# Patient Record
Sex: FEMALE | Race: WHITE | NOT HISPANIC OR LATINO | Employment: OTHER | URBAN - METROPOLITAN AREA
[De-identification: names, ages, dates, MRNs, and addresses within clinical notes are randomized per-mention and may not be internally consistent; named-entity substitution may affect disease eponyms.]

---

## 2022-03-15 DIAGNOSIS — R13.10 DYSPHAGIA: Primary | ICD-10-CM

## 2022-03-20 ENCOUNTER — HOSPITAL ENCOUNTER (EMERGENCY)
Facility: HOSPITAL | Age: 77
Discharge: ANOTHER HOSPITAL | End: 2022-03-21
Attending: STUDENT IN AN ORGANIZED HEALTH CARE EDUCATION/TRAINING PROGRAM
Payer: MEDICARE

## 2022-03-20 DIAGNOSIS — R07.9 CHEST PAIN: ICD-10-CM

## 2022-03-20 DIAGNOSIS — K92.0 COFFEE GROUND EMESIS: Primary | ICD-10-CM

## 2022-03-20 DIAGNOSIS — K80.20 CALCULUS OF GALLBLADDER WITHOUT CHOLECYSTITIS WITHOUT OBSTRUCTION: ICD-10-CM

## 2022-03-20 DIAGNOSIS — K62.89 PROCTITIS: ICD-10-CM

## 2022-03-20 DIAGNOSIS — N39.0 URINARY TRACT INFECTION WITHOUT HEMATURIA, SITE UNSPECIFIED: ICD-10-CM

## 2022-03-20 DIAGNOSIS — N17.9 AKI (ACUTE KIDNEY INJURY): ICD-10-CM

## 2022-03-20 DIAGNOSIS — J18.9 PNEUMONIA OF BOTH UPPER LOBES DUE TO INFECTIOUS ORGANISM: ICD-10-CM

## 2022-03-20 PROCEDURE — 99291 CRITICAL CARE FIRST HOUR: CPT | Mod: 25

## 2022-03-21 VITALS
WEIGHT: 178 LBS | TEMPERATURE: 97 F | DIASTOLIC BLOOD PRESSURE: 71 MMHG | OXYGEN SATURATION: 98 % | BODY MASS INDEX: 27.94 KG/M2 | RESPIRATION RATE: 17 BRPM | HEIGHT: 67 IN | HEART RATE: 102 BPM | SYSTOLIC BLOOD PRESSURE: 135 MMHG

## 2022-03-21 LAB
ABO + RH BLD: NORMAL
ALBUMIN SERPL BCP-MCNC: 2.8 G/DL (ref 3.5–5.2)
ALBUMIN SERPL BCP-MCNC: 2.9 G/DL (ref 3.5–5.2)
ALP SERPL-CCNC: 81 U/L (ref 55–135)
ALP SERPL-CCNC: 82 U/L (ref 55–135)
ALT SERPL W/O P-5'-P-CCNC: 18 U/L (ref 10–44)
ALT SERPL W/O P-5'-P-CCNC: 21 U/L (ref 10–44)
ANION GAP SERPL CALC-SCNC: 12 MMOL/L (ref 8–16)
ANION GAP SERPL CALC-SCNC: 14 MMOL/L (ref 8–16)
APTT BLDCRRT: 27.7 SEC (ref 21–32)
AST SERPL-CCNC: 16 U/L (ref 10–40)
AST SERPL-CCNC: 19 U/L (ref 10–40)
BACTERIA #/AREA URNS HPF: ABNORMAL /HPF
BASOPHILS # BLD AUTO: 0.03 K/UL (ref 0–0.2)
BASOPHILS # BLD AUTO: 0.04 K/UL (ref 0–0.2)
BASOPHILS NFR BLD: 0.2 % (ref 0–1.9)
BASOPHILS NFR BLD: 0.3 % (ref 0–1.9)
BILIRUB SERPL-MCNC: 0.5 MG/DL (ref 0.1–1)
BILIRUB SERPL-MCNC: 0.6 MG/DL (ref 0.1–1)
BILIRUB UR QL STRIP: NEGATIVE
BLD GP AB SCN CELLS X3 SERPL QL: NORMAL
BNP SERPL-MCNC: 94 PG/ML (ref 0–99)
BUN SERPL-MCNC: 37 MG/DL (ref 8–23)
BUN SERPL-MCNC: 39 MG/DL (ref 8–23)
CALCIUM SERPL-MCNC: 9.4 MG/DL (ref 8.7–10.5)
CALCIUM SERPL-MCNC: 9.5 MG/DL (ref 8.7–10.5)
CHLORIDE SERPL-SCNC: 100 MMOL/L (ref 95–110)
CHLORIDE SERPL-SCNC: 98 MMOL/L (ref 95–110)
CLARITY UR: ABNORMAL
CO2 SERPL-SCNC: 24 MMOL/L (ref 23–29)
CO2 SERPL-SCNC: 26 MMOL/L (ref 23–29)
COLOR UR: YELLOW
CREAT SERPL-MCNC: 1.5 MG/DL (ref 0.5–1.4)
CREAT SERPL-MCNC: 1.5 MG/DL (ref 0.5–1.4)
DIFFERENTIAL METHOD: ABNORMAL
DIFFERENTIAL METHOD: ABNORMAL
EOSINOPHIL # BLD AUTO: 0.1 K/UL (ref 0–0.5)
EOSINOPHIL # BLD AUTO: 0.3 K/UL (ref 0–0.5)
EOSINOPHIL NFR BLD: 0.6 % (ref 0–8)
EOSINOPHIL NFR BLD: 2.2 % (ref 0–8)
ERYTHROCYTE [DISTWIDTH] IN BLOOD BY AUTOMATED COUNT: 17.2 % (ref 11.5–14.5)
ERYTHROCYTE [DISTWIDTH] IN BLOOD BY AUTOMATED COUNT: 17.3 % (ref 11.5–14.5)
EST. GFR  (AFRICAN AMERICAN): 38 ML/MIN/1.73 M^2
EST. GFR  (AFRICAN AMERICAN): 38 ML/MIN/1.73 M^2
EST. GFR  (NON AFRICAN AMERICAN): 33 ML/MIN/1.73 M^2
EST. GFR  (NON AFRICAN AMERICAN): 33 ML/MIN/1.73 M^2
GLUCOSE SERPL-MCNC: 115 MG/DL (ref 70–110)
GLUCOSE SERPL-MCNC: 121 MG/DL (ref 70–110)
GLUCOSE UR QL STRIP: NEGATIVE
HCT VFR BLD AUTO: 32.5 % (ref 37–48.5)
HCT VFR BLD AUTO: 34.3 % (ref 37–48.5)
HGB BLD-MCNC: 10.4 G/DL (ref 12–16)
HGB BLD-MCNC: 10.8 G/DL (ref 12–16)
HGB UR QL STRIP: NEGATIVE
HYALINE CASTS #/AREA URNS LPF: 0 /LPF
IMM GRANULOCYTES # BLD AUTO: 0.08 K/UL (ref 0–0.04)
IMM GRANULOCYTES # BLD AUTO: 0.11 K/UL (ref 0–0.04)
IMM GRANULOCYTES NFR BLD AUTO: 0.5 % (ref 0–0.5)
IMM GRANULOCYTES NFR BLD AUTO: 0.6 % (ref 0–0.5)
INR PPP: 1 (ref 0.8–1.2)
KETONES UR QL STRIP: NEGATIVE
LACTATE SERPL-SCNC: 1 MMOL/L (ref 0.5–2.2)
LEUKOCYTE ESTERASE UR QL STRIP: ABNORMAL
LIPASE SERPL-CCNC: 64 U/L (ref 4–60)
LYMPHOCYTES # BLD AUTO: 1.4 K/UL (ref 1–4.8)
LYMPHOCYTES # BLD AUTO: 1.6 K/UL (ref 1–4.8)
LYMPHOCYTES NFR BLD: 10.2 % (ref 18–48)
LYMPHOCYTES NFR BLD: 8.2 % (ref 18–48)
MCH RBC QN AUTO: 29.2 PG (ref 27–31)
MCH RBC QN AUTO: 29.4 PG (ref 27–31)
MCHC RBC AUTO-ENTMCNC: 31.5 G/DL (ref 32–36)
MCHC RBC AUTO-ENTMCNC: 32 G/DL (ref 32–36)
MCV RBC AUTO: 92 FL (ref 82–98)
MCV RBC AUTO: 93 FL (ref 82–98)
MICROSCOPIC COMMENT: ABNORMAL
MONOCYTES # BLD AUTO: 1.2 K/UL (ref 0.3–1)
MONOCYTES # BLD AUTO: 1.2 K/UL (ref 0.3–1)
MONOCYTES NFR BLD: 7.2 % (ref 4–15)
MONOCYTES NFR BLD: 7.8 % (ref 4–15)
NEUTROPHILS # BLD AUTO: 12 K/UL (ref 1.8–7.7)
NEUTROPHILS # BLD AUTO: 14.1 K/UL (ref 1.8–7.7)
NEUTROPHILS NFR BLD: 79 % (ref 38–73)
NEUTROPHILS NFR BLD: 83.2 % (ref 38–73)
NITRITE UR QL STRIP: NEGATIVE
NRBC BLD-RTO: 0 /100 WBC
NRBC BLD-RTO: 0 /100 WBC
OB PNL STL: NEGATIVE
OTHER ELEMENTS URNS MICRO: ABNORMAL
PH UR STRIP: >8 [PH] (ref 5–8)
PLATELET # BLD AUTO: 541 K/UL (ref 150–450)
PLATELET # BLD AUTO: 548 K/UL (ref 150–450)
PMV BLD AUTO: 10.5 FL (ref 9.2–12.9)
PMV BLD AUTO: 10.7 FL (ref 9.2–12.9)
POTASSIUM SERPL-SCNC: 4.7 MMOL/L (ref 3.5–5.1)
POTASSIUM SERPL-SCNC: 4.8 MMOL/L (ref 3.5–5.1)
PROT SERPL-MCNC: 6.5 G/DL (ref 6–8.4)
PROT SERPL-MCNC: 7.1 G/DL (ref 6–8.4)
PROT UR QL STRIP: ABNORMAL
PROTHROMBIN TIME: 10.3 SEC (ref 9–12.5)
RBC # BLD AUTO: 3.54 M/UL (ref 4–5.4)
RBC # BLD AUTO: 3.7 M/UL (ref 4–5.4)
RBC #/AREA URNS HPF: 6 /HPF (ref 0–4)
SARS-COV-2 RDRP RESP QL NAA+PROBE: NEGATIVE
SODIUM SERPL-SCNC: 136 MMOL/L (ref 136–145)
SODIUM SERPL-SCNC: 138 MMOL/L (ref 136–145)
SP GR UR STRIP: 1.01 (ref 1–1.03)
TROPONIN I SERPL DL<=0.01 NG/ML-MCNC: <0.006 NG/ML (ref 0–0.03)
URN SPEC COLLECT METH UR: ABNORMAL
UROBILINOGEN UR STRIP-ACNC: NEGATIVE EU/DL
WBC # BLD AUTO: 15.17 K/UL (ref 3.9–12.7)
WBC # BLD AUTO: 16.99 K/UL (ref 3.9–12.7)
WBC #/AREA URNS HPF: >100 /HPF (ref 0–5)

## 2022-03-21 PROCEDURE — 83605 ASSAY OF LACTIC ACID: CPT | Performed by: STUDENT IN AN ORGANIZED HEALTH CARE EDUCATION/TRAINING PROGRAM

## 2022-03-21 PROCEDURE — 84484 ASSAY OF TROPONIN QUANT: CPT | Performed by: STUDENT IN AN ORGANIZED HEALTH CARE EDUCATION/TRAINING PROGRAM

## 2022-03-21 PROCEDURE — 82272 OCCULT BLD FECES 1-3 TESTS: CPT | Performed by: STUDENT IN AN ORGANIZED HEALTH CARE EDUCATION/TRAINING PROGRAM

## 2022-03-21 PROCEDURE — 85610 PROTHROMBIN TIME: CPT | Performed by: STUDENT IN AN ORGANIZED HEALTH CARE EDUCATION/TRAINING PROGRAM

## 2022-03-21 PROCEDURE — 25500020 PHARM REV CODE 255: Performed by: STUDENT IN AN ORGANIZED HEALTH CARE EDUCATION/TRAINING PROGRAM

## 2022-03-21 PROCEDURE — 86850 RBC ANTIBODY SCREEN: CPT | Performed by: STUDENT IN AN ORGANIZED HEALTH CARE EDUCATION/TRAINING PROGRAM

## 2022-03-21 PROCEDURE — 87186 SC STD MICRODIL/AGAR DIL: CPT | Performed by: STUDENT IN AN ORGANIZED HEALTH CARE EDUCATION/TRAINING PROGRAM

## 2022-03-21 PROCEDURE — 87086 URINE CULTURE/COLONY COUNT: CPT | Performed by: STUDENT IN AN ORGANIZED HEALTH CARE EDUCATION/TRAINING PROGRAM

## 2022-03-21 PROCEDURE — 83880 ASSAY OF NATRIURETIC PEPTIDE: CPT | Performed by: STUDENT IN AN ORGANIZED HEALTH CARE EDUCATION/TRAINING PROGRAM

## 2022-03-21 PROCEDURE — 85730 THROMBOPLASTIN TIME PARTIAL: CPT | Performed by: STUDENT IN AN ORGANIZED HEALTH CARE EDUCATION/TRAINING PROGRAM

## 2022-03-21 PROCEDURE — 36415 COLL VENOUS BLD VENIPUNCTURE: CPT | Performed by: STUDENT IN AN ORGANIZED HEALTH CARE EDUCATION/TRAINING PROGRAM

## 2022-03-21 PROCEDURE — 87040 BLOOD CULTURE FOR BACTERIA: CPT | Mod: 59 | Performed by: STUDENT IN AN ORGANIZED HEALTH CARE EDUCATION/TRAINING PROGRAM

## 2022-03-21 PROCEDURE — 93010 EKG 12-LEAD: ICD-10-PCS | Mod: ,,, | Performed by: INTERNAL MEDICINE

## 2022-03-21 PROCEDURE — 87077 CULTURE AEROBIC IDENTIFY: CPT | Performed by: STUDENT IN AN ORGANIZED HEALTH CARE EDUCATION/TRAINING PROGRAM

## 2022-03-21 PROCEDURE — 25000003 PHARM REV CODE 250: Performed by: STUDENT IN AN ORGANIZED HEALTH CARE EDUCATION/TRAINING PROGRAM

## 2022-03-21 PROCEDURE — 80053 COMPREHEN METABOLIC PANEL: CPT | Mod: 91 | Performed by: STUDENT IN AN ORGANIZED HEALTH CARE EDUCATION/TRAINING PROGRAM

## 2022-03-21 PROCEDURE — 85025 COMPLETE CBC W/AUTO DIFF WBC: CPT | Performed by: STUDENT IN AN ORGANIZED HEALTH CARE EDUCATION/TRAINING PROGRAM

## 2022-03-21 PROCEDURE — 81000 URINALYSIS NONAUTO W/SCOPE: CPT | Performed by: STUDENT IN AN ORGANIZED HEALTH CARE EDUCATION/TRAINING PROGRAM

## 2022-03-21 PROCEDURE — U0002 COVID-19 LAB TEST NON-CDC: HCPCS | Performed by: STUDENT IN AN ORGANIZED HEALTH CARE EDUCATION/TRAINING PROGRAM

## 2022-03-21 PROCEDURE — 63600175 PHARM REV CODE 636 W HCPCS: Performed by: STUDENT IN AN ORGANIZED HEALTH CARE EDUCATION/TRAINING PROGRAM

## 2022-03-21 PROCEDURE — 85025 COMPLETE CBC W/AUTO DIFF WBC: CPT | Mod: 91 | Performed by: STUDENT IN AN ORGANIZED HEALTH CARE EDUCATION/TRAINING PROGRAM

## 2022-03-21 PROCEDURE — C9113 INJ PANTOPRAZOLE SODIUM, VIA: HCPCS | Performed by: STUDENT IN AN ORGANIZED HEALTH CARE EDUCATION/TRAINING PROGRAM

## 2022-03-21 PROCEDURE — 96365 THER/PROPH/DIAG IV INF INIT: CPT

## 2022-03-21 PROCEDURE — 80053 COMPREHEN METABOLIC PANEL: CPT | Performed by: STUDENT IN AN ORGANIZED HEALTH CARE EDUCATION/TRAINING PROGRAM

## 2022-03-21 PROCEDURE — 93005 ELECTROCARDIOGRAM TRACING: CPT

## 2022-03-21 PROCEDURE — S0030 INJECTION, METRONIDAZOLE: HCPCS | Performed by: STUDENT IN AN ORGANIZED HEALTH CARE EDUCATION/TRAINING PROGRAM

## 2022-03-21 PROCEDURE — 93010 ELECTROCARDIOGRAM REPORT: CPT | Mod: ,,, | Performed by: INTERNAL MEDICINE

## 2022-03-21 PROCEDURE — 87088 URINE BACTERIA CULTURE: CPT | Performed by: STUDENT IN AN ORGANIZED HEALTH CARE EDUCATION/TRAINING PROGRAM

## 2022-03-21 PROCEDURE — 96375 TX/PRO/DX INJ NEW DRUG ADDON: CPT

## 2022-03-21 PROCEDURE — 96367 TX/PROPH/DG ADDL SEQ IV INF: CPT

## 2022-03-21 PROCEDURE — 83690 ASSAY OF LIPASE: CPT | Performed by: STUDENT IN AN ORGANIZED HEALTH CARE EDUCATION/TRAINING PROGRAM

## 2022-03-21 PROCEDURE — 96374 THER/PROPH/DIAG INJ IV PUSH: CPT | Mod: 59

## 2022-03-21 PROCEDURE — 96361 HYDRATE IV INFUSION ADD-ON: CPT

## 2022-03-21 RX ORDER — NAPROXEN SODIUM 220 MG/1
324 TABLET, FILM COATED ORAL
Status: DISCONTINUED | OUTPATIENT
Start: 2022-03-21 | End: 2022-03-21

## 2022-03-21 RX ORDER — ONDANSETRON 2 MG/ML
8 INJECTION INTRAMUSCULAR; INTRAVENOUS
Status: COMPLETED | OUTPATIENT
Start: 2022-03-21 | End: 2022-03-21

## 2022-03-21 RX ORDER — METRONIDAZOLE 500 MG/100ML
500 INJECTION, SOLUTION INTRAVENOUS
Status: COMPLETED | OUTPATIENT
Start: 2022-03-21 | End: 2022-03-21

## 2022-03-21 RX ORDER — CEFEPIME HYDROCHLORIDE 1 G/50ML
2 INJECTION, SOLUTION INTRAVENOUS
Status: COMPLETED | OUTPATIENT
Start: 2022-03-21 | End: 2022-03-21

## 2022-03-21 RX ORDER — VANCOMYCIN HCL IN 5 % DEXTROSE 1G/250ML
1000 PLASTIC BAG, INJECTION (ML) INTRAVENOUS
Status: DISCONTINUED | OUTPATIENT
Start: 2022-03-22 | End: 2022-03-21 | Stop reason: HOSPADM

## 2022-03-21 RX ORDER — PANTOPRAZOLE SODIUM 40 MG/10ML
80 INJECTION, POWDER, LYOPHILIZED, FOR SOLUTION INTRAVENOUS
Status: COMPLETED | OUTPATIENT
Start: 2022-03-21 | End: 2022-03-21

## 2022-03-21 RX ADMIN — CEFEPIME HYDROCHLORIDE 2 G: 2 INJECTION, SOLUTION INTRAVENOUS at 05:03

## 2022-03-21 RX ADMIN — IOHEXOL 100 ML: 350 INJECTION, SOLUTION INTRAVENOUS at 02:03

## 2022-03-21 RX ADMIN — SODIUM CHLORIDE, SODIUM LACTATE, POTASSIUM CHLORIDE, AND CALCIUM CHLORIDE 500 ML: .6; .31; .03; .02 INJECTION, SOLUTION INTRAVENOUS at 04:03

## 2022-03-21 RX ADMIN — ONDANSETRON HYDROCHLORIDE 8 MG: 2 SOLUTION INTRAMUSCULAR; INTRAVENOUS at 07:03

## 2022-03-21 RX ADMIN — PANTOPRAZOLE SODIUM 8 MG/HR: 40 INJECTION, POWDER, FOR SOLUTION INTRAVENOUS at 07:03

## 2022-03-21 RX ADMIN — VANCOMYCIN HYDROCHLORIDE 1500 MG: 1.5 INJECTION, POWDER, LYOPHILIZED, FOR SOLUTION INTRAVENOUS at 08:03

## 2022-03-21 RX ADMIN — PANTOPRAZOLE SODIUM 80 MG: 40 INJECTION, POWDER, FOR SOLUTION INTRAVENOUS at 07:03

## 2022-03-21 RX ADMIN — METRONIDAZOLE 500 MG: 500 INJECTION, SOLUTION INTRAVENOUS at 06:03

## 2022-03-21 NOTE — ED NOTES
Pt resting comfortably on ED stretcher. NADN. AAOx3. Respirations even and unlabored. Bed locked in lowest position. Call bell within reach. Son at bedside. Protonix infusing without difficulty. Awaiting MD orders.

## 2022-03-21 NOTE — ED NOTES
Report taken from ELO Ontiveros. Pt resting comfortably on ED stretcher. NADN. AAOx3. Respirations even and unlabored. Bed locked in lowest position. Call bell within reach. Son at bedside. Antibiotics infusing without difficulty. Awaiting MD orders.

## 2022-03-21 NOTE — ED NOTES
Pt resting comfortably on ED stretcher. NADN. AAOx3. Respirations even and unlabored. Bed locked in lowest position. Call bell within reach. Protonix and antibiotics infusing without difficulty. Son at bedside. Awaiting transfer.

## 2022-03-21 NOTE — PROVIDER PROGRESS NOTES - EMERGENCY DEPT.
Encounter Date: 3/20/2022    ED Physician Progress Notes             I am assuming care of patient Caron Enrique from physician Dr. Reece at 6:00 AM.    76 year old who presented with emesis, abdominal and chest pain.    I spoke with the patient and the son. Had a large amount of dark brown emesis at the nursing home. Was on blood thinners when admitted for pneumonia but denies any regular NSAID use or outpatient anticoagulation/antiplatelets.    Leukocytosis, ALEXANDER, UTI, Hgb 10.4. BUN 39, Cr 1.5.    Dr. Rausch was alerted by Dr. Reece regarding CT results; recommends US, states if cholecystitis, can stay here.    Condition: stable  Pending: RUQ US  Contingency plan: if cholecystitis, surgery consult  Anticipated dispo: pending US but likely admit for ALEXANDER, UTI +/- cholecystitis    I am adding repeat CBC, CMP, coag studies, type and screen, and protonix bolus + drip for possible upper GI bleed.                Given history of coffee ground emesis - I am concerned regarding an upper GI bleed. No cholecystitis on US, normal LFTs. I recommend transfer for GI evaluation. Patient is requesting [facility] where she was recently admitted and discharged for pneumonia. She was admitted for 30 days, intubated for 11 days, had a PEG tube placed by Dr. Carvajal and was seeing Dr. Marshall at [facility].

## 2022-03-21 NOTE — PROGRESS NOTES
Pharmacokinetic Initial Assessment: IV Vancomycin    Assessment/Plan:    Initiate intravenous vancomycin with loading dose of 1500 mg once (20mg/kg due to renal function) followed by a maintenance dose of vancomycin 1000mg IV every 24 hours  Desired empiric serum trough concentration is 10 to 15 mcg/mL  Draw vancomycin trough level 60 min prior to third dose on 3/23 at approximately 0630  Pharmacy will continue to follow and monitor vancomycin.      Please contact pharmacy at extension [phone] with any questions regarding this assessment.     Thank you for the consult,   Arabella Marin       Patient brief summary:  Caron Enrique is a 76 y.o. female initiated on antimicrobial therapy with IV Vancomycin for treatment of suspected Intra-abdominal Comment - Cholecystitis    Drug Allergies:   Review of patient's allergies indicates:   Allergen Reactions    Erythromycin base        Actual Body Weight:   80kg    Renal Function:   Estimated Creatinine Clearance: 33.8 mL/min (A) (based on SCr of 1.5 mg/dL (H)).,     Dialysis Method (if applicable):  N/A    CBC (last 72 hours):  Recent Labs   Lab Result Units 03/21/22  0055   WBC K/uL 15.17*   Hemoglobin g/dL 10.4*   Hematocrit % 32.5*   Platelets K/uL 541*   Gran % % 79.0*   Lymph % % 10.2*   Mono % % 7.8   Eosinophil % % 2.2   Basophil % % 0.3   Differential Method  Automated       Metabolic Panel (last 72 hours):  Recent Labs   Lab Result Units 03/21/22  0055 03/21/22  0300   Sodium mmol/L 138  --    Potassium mmol/L 4.8  --    Chloride mmol/L 100  --    CO2 mmol/L 24  --    Glucose mg/dL 115*  --    Glucose, UA   --  Negative   BUN mg/dL 39*  --    Creatinine mg/dL 1.5*  --    Albumin g/dL 2.9*  --    Total Bilirubin mg/dL 0.5  --    Alkaline Phosphatase U/L 81  --    AST U/L 19  --    ALT U/L 21  --        Drug levels (last 3 results):  No results for input(s): VANCOMYCINRA, VANCOMYCINPE, VANCOMYCINTR in the last 72 hours.    Microbiologic Results:  Microbiology  Results (last 7 days)       Procedure Component Value Units Date/Time    Urine culture [[id number]] Collected: 03/21/22 0300    Order Status: No result Specimen: Urine Updated: 03/21/22 0317

## 2022-03-21 NOTE — ED NOTES
Pt unable to urinate at this time. Pt diapered - refused catheter. States she will let us know when she needs to urinate and will try a bed pan at that time.

## 2022-03-21 NOTE — ED PROVIDER NOTES
[facility]                                                  Chief Complaint     Chief Complaint   Patient presents with    Abdominal Pain     Generalized abd pain and midsternal chest pain onset yesterday with n/v onset today - PEG tube.     Chest Pain       History of Present Illness  76 y.o. female with recently diagnosed with pneumonia at the end of last month and was subsequently intubated. PEG tube placed during that admission. She was eventually extubated and discharged 3 weeks ago.  Following discharge, she started rehab and has been doing well with this. However, 1 week ago, she began having vomiting and chest pain. This morning, she had copious dark brown vomiting, prompting concern for GI bleed by her nursing home staff. Endorses abdominal and chest pain, which she states feels like acid reflux. The pain is described as burning, 6/10 in severity and intermittent. She does not remember the last time that she had a bowel movement nor the last time she passed gas. She is currently nauseated  Denies heavy NSAID use, heavy EtOH use/abuse, syncope, lightheadedness, shortness of breath, coagulopathy or anticoagulant use.      History obtained from:  Patient, son    Review of patient's allergies indicates:   Allergen Reactions    Erythromycin base      No past medical history on file.  No past surgical history on file.   No family history on file.          Review of Systems and Physical Exam     Review of Systems      + Abdominal pain, chest pain, brown emesis    All other symptoms negative as stated below    --Constitutional - Denies fever, appetite change, chills  --Eyes - Denies eye discharge, eye pain, eye redness or visual disturbance  --HENT- Denies congestion, sore throat, drooling, ear discharge, rhinorrhea or trouble swallowing  --Respiratory - Denies cough, shortness of breath, wheezing  --Cardiovascular - Denies chest pain, leg swelling, palpitations  --Gastrointestinal - Denies abdominal  "pain, abdominal distension, constipation, diarrhea, nausea or vomiting  --Genitourinary - Denies difficulty urinating, dysuria, hematuria, urgency  --Musculoskeletal - Denies arthralgias, myalgias  --Neurological - Denies dizziness, headaches, lightheadedness, weakness  --Hematologic - Denies easy bruising or easy bleeding  --Skin - Denies rash, wound or pallor  --Psychiatric- Denies dysphoric mood, nervousness/anxiety    Vital Signs   height is 5' 7" (1.702 m) and weight is 80.7 kg (178 lb). Her oral temperature is 96.9 °F (36.1 °C). Her blood pressure is 132/64 and her pulse is 94. Her respiration is 17 and oxygen saturation is 99%.      Physical Exam   Nursing note and vitals reviewed  --Constitutional:  Well developed, well nourished. In no acute distress.  --HENT: Normocephalic, atraumatic. No rhinorrhea. Moist oral mucosa. No oropharyngeal edema, erythema or exudates.   --Eyes: PERRL. Extraocular movements intact. No periorbital swelling. Normal conjunctiva.  --Neck: Normal range of motion. Neck supple. No adenopathy  --Cardiac: Regular rhythm, normal S1, normal S2, no murmur, normal rate, intact distal pulses  --Pulmonary: On 2L Nasal cannula (home amount). Normal respiratory effort, breath sounds normal and equal bilaterally, no accessory muscle use, no respiratory distress  --Abdominal: Hypoactive bowel sounds. PEG tube in place in LUQ with site clean, dry and intact with no surrounding erythema. Epigastric TTP. Soft, no guarding, no rebound. No flank or CVA tenderness.   --Rectal: No melena or hematochezia present.   --Musculoskeletal: Normal range of motion. No deformity, tenderness or edema.  --Neurological:  Alert and oriented x 4. Follows commands appropriately.    --Skin: Warm and dry. No rash, pallor, cyanosis or jaundice.  --Psych: Normal mood    ED Course   Critical Care    Date/Time: 3/21/2022 5:54 AM  Performed by: Francisco Reece MD  Authorized by: Francisco Reece MD   Direct patient " critical care time: 8 minutes  Additional history critical care time: 8 minutes  Ordering / reviewing critical care time: 7 minutes  Documentation critical care time: 7 minutes  Consulting other physicians critical care time: 7 minutes  Total critical care time (exclusive of procedural time) : 37 minutes  Critical care time was exclusive of separately billable procedures and treating other patients.  Critical care was necessary to treat or prevent imminent or life-threatening deterioration of the following conditions: hepatic failure, sepsis and circulatory failure.  Critical care was time spent personally by me on the following activities: blood draw for specimens, development of treatment plan with patient or surrogate, discussions with consultants, evaluation of patient's response to treatment, review of old charts, pulse oximetry, re-evaluation of patient's condition, ordering and review of radiographic studies, ordering and review of laboratory studies, ordering and performing treatments and interventions, obtaining history from patient or surrogate and examination of patient.        Lab Results (reviewed by me)  Labs Reviewed   LIPASE - Abnormal; Notable for the following components:       Result Value    Lipase 64 (*)     All other components within normal limits   CBC W/ AUTO DIFFERENTIAL - Abnormal; Notable for the following components:    WBC 15.17 (*)     RBC 3.54 (*)     Hemoglobin 10.4 (*)     Hematocrit 32.5 (*)     RDW 17.2 (*)     Platelets 541 (*)     Gran # (ANC) 12.0 (*)     Immature Grans (Abs) 0.08 (*)     Mono # 1.2 (*)     Gran % 79.0 (*)     Lymph % 10.2 (*)     All other components within normal limits   COMPREHENSIVE METABOLIC PANEL - Abnormal; Notable for the following components:    Glucose 115 (*)     BUN 39 (*)     Creatinine 1.5 (*)     Albumin 2.9 (*)     eGFR if  38 (*)     eGFR if non  33 (*)     All other components within normal limits   URINALYSIS,  REFLEX TO URINE CULTURE - Abnormal; Notable for the following components:    Appearance, UA Cloudy (*)     Protein, UA 2+ (*)     Leukocytes, UA 3+ (*)     All other components within normal limits    Narrative:     Specimen Source->Urine   URINALYSIS MICROSCOPIC - Abnormal; Notable for the following components:    RBC, UA 6 (*)     WBC, UA >100 (*)     Bacteria Many (*)     Other (U/A) Rare (*)     All other components within normal limits    Narrative:     Specimen Source->Urine   CULTURE, URINE   CULTURE, BLOOD   CULTURE, BLOOD   B-TYPE NATRIURETIC PEPTIDE   TROPONIN I   LACTIC ACID, PLASMA   OCCULT BLOOD X 1, STOOL   SARS-COV-2 RNA AMPLIFICATION, QUAL       EKG (interpreted by me)  Rate: 84 bpm  Rhythm: Normal Sinus Rhythm  Axis: Normal  ST-segments: No elevation or depression  Overall Interpretation: Normal sinus rhythm with no signs of ischemia or infarction    Radiology (images visualized & reports reviewed by me)  X-Ray Chest 1 View    (Results Pending)   CT Abdomen Pelvis With Contrast    (Results Pending)   US Abdomen Limited    (Results Pending)     Medications Given  Medications   aspirin chewable tablet 324 mg (324 mg Oral Not Given 3/21/22 0045)   vancomycin - pharmacy to dose (has no administration in time range)   metronidazole IVPB 500 mg (has no administration in time range)   vancomycin 1.5 g in dextrose 5 % 250 mL IVPB (ready to mix) (has no administration in time range)   vancomycin in dextrose 5 % 1 gram/250 mL IVPB 1,000 mg (1,000 mg Intravenous Trough Due As Scheduled Before Dose 3/23/22 0630)   iohexoL (OMNIPAQUE 350) injection 100 mL (100 mLs Intravenous Given 3/21/22 0240)   lactated ringers bolus 500 mL (0 mLs Intravenous Paused 3/21/22 0515)   cefepime in dextrose 5 % IVPB 2 g (0 g Intravenous Stopped 3/21/22 0550)       Differential Diagnosis:  GERD, PUD, Gastritis, cystis, diverticulitis, pancreatitis, cholelithiasis, cholecystitis, aortic dissection, acute hepatitis, pyelonephritis  "NSTEMI, STEMI, unstable angina, aortic dissection, pulmonary embolism, tension pneumothorax, pericarditis, valvular heart disease, pneumonia    Clinical Tests:  Lab Tests: Ordered and reviewed  Radiological Study: Ordered and reviewed  Medical Tests: Ordered and reviewed    ED Management     height is 5' 7" (1.702 m) and weight is 80.7 kg (178 lb). Her oral temperature is 96.9 °F (36.1 °C). Her blood pressure is 132/64 and her pulse is 94. Her respiration is 17 and oxygen saturation is 99%.     Physical exam with epigastric tenderness and hypoactive bowel sounds.  Lab work-up revealed ALEXANDER, UTI and leukocytosis. CXR revealed interstitial lung disease with possible super imposed pneumonia. CT-Abd/Pelvis revealed findings consistent with early cholecystitis. I reached out to General Surgery (Dr. Rausch) who recommended Abd U/S for further characterization. Broad spectrum antibiotics given and blood cultures obtained. U/S currently pending. Care will be handed off to oncoming physician, Dr. Bueno.    Diagnosis  The primary encounter diagnosis was Urinary tract infection without hematuria, site unspecified. Diagnoses of Chest pain, Cholecystitis, ALEXANDER (acute kidney injury), and Pneumonia of both upper lobes due to infectious organism were also pertinent to this visit.    Disposition and Plan  Condition: Stable  Disposition: Handed off to oncoming ED physician           Francisco Reece MD  03/21/22 0607    "

## 2022-03-23 ENCOUNTER — CLINICAL SUPPORT (OUTPATIENT)
Dept: REHABILITATION | Facility: HOSPITAL | Age: 77
End: 2022-03-23
Payer: MEDICARE

## 2022-03-23 ENCOUNTER — HOSPITAL ENCOUNTER (OUTPATIENT)
Dept: RADIOLOGY | Facility: HOSPITAL | Age: 77
Discharge: HOME | End: 2022-03-23
Attending: HOSPITALIST
Payer: OTHER MISCELLANEOUS

## 2022-03-23 DIAGNOSIS — R13.10 DYSPHAGIA: ICD-10-CM

## 2022-03-23 DIAGNOSIS — R13.12 OROPHARYNGEAL DYSPHAGIA: ICD-10-CM

## 2022-03-23 LAB — BACTERIA UR CULT: ABNORMAL

## 2022-03-23 PROCEDURE — 25500020 PHARM REV CODE 255: Performed by: HOSPITALIST

## 2022-03-23 PROCEDURE — 92610 EVALUATE SWALLOWING FUNCTION: CPT | Mod: 59,PN

## 2022-03-23 PROCEDURE — 74230 X-RAY XM SWLNG FUNCJ C+: CPT | Mod: TC

## 2022-03-23 PROCEDURE — 92611 MOTION FLUOROSCOPY/SWALLOW: CPT | Mod: PN

## 2022-03-23 PROCEDURE — A9698 NON-RAD CONTRAST MATERIALNOC: HCPCS | Performed by: HOSPITALIST

## 2022-03-23 RX ADMIN — BARIUM SULFATE 296 ML: 0.81 POWDER, FOR SUSPENSION ORAL at 03:03

## 2022-03-26 LAB
BACTERIA BLD CULT: NORMAL
BACTERIA BLD CULT: NORMAL

## 2022-03-28 NOTE — PLAN OF CARE
SPEECH LANGUAGE PATHOLOGY  Outpatient Modified Barium Swallow Study    Time In: 14:10  Time Out: 15:05  Total Time: 55 minutes   Charges: 1x Motion flouro swallow, cine/vid (CPT 14594); 1x SWALLOWING AND ORAL FUNCTION EVALUATION [65642 (CPT®)]  Referring Physician: Jerome Thompson MD       History:    Caron Enrique is a 76 y.o. female who was seen in radiology today for a modified barium swallow study. She was seated upright in a chair for a lateral videofluoroscopic view. Past medical history was collected via chart review and patient/family interview and is significant for hx of dysphagia, hx of dysphagia related pulmonary complications, current alternate means of nutrition/hydration, and current NPO status. This study is being done to assess swallow physiology, determine the most appropriate diet, and establish the most appropriate treatment plan.      No past medical history on file.  No past surgical history on file.  No current outpatient medications on file.      Presentations:    All presentations were mixed with dry barium  Thin liquids: clinician fed by the tsp; self-fed by the single and consecutive open cup and straw sips  Nectar thick liquids: clinician fed by the tsp; self-fed by the single and consecutive open cup and straw sips  Honey-thick liquids: clinician fed by the tsp   Puree: clinician-fed by the tsp of pudding  Solid: self-fed by the square inch of edouard meneses *of note: majority of solid bolus was orally expelled 2/2 patient unable to masticate       Evaluation:      Cranial Nerve/ Oral Mechanism Exam:  Mandibular Strength and Mobility - Trigeminal Nerve (CN V) Rest Within functional limits     Lateralization Within functional limits     Protrusion Within functional limits     Retraction Within functional limits     Involuntary Movement Present or absent? Absent    Oral Labial Strength and Mobility - Facial Nerve (CN VII) Rest Within functional limits     Lateralization Within  functional limits     Protrusion Within functional limits     Alternating Pucker/Retraction Within functional limits     Retraction Within functional limits     Involuntary Movement Present or absent? Absent    Lingual Strength and Mobility - Hypoglossal Nerve (CN XII) Rest Within functional limits     Lateralization Within functional limits     Protrusion Within functional limits     Elevation Within functional limits     Involuntary Movement Present or absent? Absent    Velar Elevation - Glossopharyngeal Nerve (CN IX) and Vagus Nerve (CN X) Rest Within functional limits     Symmetry Within functional limits     Elevation Within functional limits     Sustained elevation within functional limits     Involuntary Movement Present or absent? Absent    Buccal Strength and Mobility - Facial Nerve (CN VII) Rest Within functional limits     Action Within functional limits       Facial Nerve (CN VII)  Rest Within functional limits     Symmetry Within functional limits     Wrinkle forehead Within functional limits     Closes eyes tightly Within functional limits     Sensitivity to loud noises Yes or no? No     Involuntary Movement Present or absent? Absent    Facial, Glossopharyngeal, and Vagus Nerve Detects changes in bolus size, temperature, and texture? Yes or no? Yes          Oral phase findings  Lip closure: mildly impaired resulting in anterior spillage   Tongue control: within functional limits   Bolus preparation/mastication: unable to masticate solids; piecemeal deglutition   Bolus transport/lingual motion: within functional limits   Oral residue: moderate lingual residue      Pharyngeal phase findings  Initiation of swallow: delay to pyriform sinuses given thin liquids, delay to valleculae given nectar thick liquids   Velar elevation: within normal limits   Laryngeal elevation: moderatley impaired contributing to penetration/aspiration   Anterior hyoid excursion: moderately impaired contributing to  penetration/aspiration   Epiglottic movement: within functional limits   Laryngeal vestibule closure: moderately impaired contributing to penetration/aspiration   Pharyngeal stripping wave: within functional limits   Pharyngeal contraction: unable to view 2/2 unable to obtain A/P view   PES opening: mild-moderately  impaired resulting in untimely bolus acceptance/residue   BOT retraction: mildly impaired resulting in vallecular residue   Pharyngeal residue: mild-moderate vallecular and PES residue      Esophageal findings   Esophageal clearance: Observed stasis and retrograde flow in the distal 1/3 of the esophagus given nectar thick liquids. Cleared given 2 minute wait time and thin liquid wash. Observed stasis and retrograde flow in the medial to distal esophagus given puree. Thin liquid was provided, stasis and retrograde flow of thin liquid wash observed. Stasis cleared given additional 1 minute wait time and cleansing swallow x3.        8-point Penetration-Aspiration Scale (PAS)  Thin liquids: 6 - Material enters the airway, passes below the vocal folds, and is ejected into the larynx or out of the airway  Nectar-thick liquids: 2 - Material enters the airway, remains above the vocal folds, and is ejected from the airway  Honey-think liquids: 1 - Material does not enter airway  Puree: 1 - Material does not enter airway  Solid: 1 - Material does not enter airway     Compensatory Swallow Strategies  Postures:  Chin tuck posture: effective in mitigating penetration/aspiration and vallecular residue     Maneuvers:  3-second preparatory set: ineffective in mitigating swallow delay   Supraglottic swallow: effective in mitigating aspiration   Mendelsohn maneuver: effective in preventing penetration/aspiration   Double swallow: effective in reducing oral and pharyngeal residue  Volitional cough: effective in ejecting aspirated material       Assessment:   Dysphagia Outcome and Severity Scale (CHARLINE)  Level 3 (Full P.O.,  modified diet and/or independence): Moderate dysphagia: Total assist, supervision, or strategies, two or more diet consistencies restricted; May exhibit one or more of the following: Moderate retention in pharynx, cleared with cue OR moderate retention in oral cavity, cleared with cue; OR Airway penetration to the level of the vocal cords without cough with two or more consistencies OR aspiration with two consistencies with weak or no reflexive cough OR aspiration with one consistency with no cough and airway penetration to cords with one, no cough    Patient presents with moderate oropharyngeal dysphagia and suspected esophageal dysphagia. Swallow safety is impaired; swallow efficiency is impaired. Pt appears to be at moderate risk for aspiration PNA, and moderate risk for malnutrition/dehydration. Diet modification is indicated. If patient is unable to meet nutritional needs via recommended diet consistencies with strict adherence to risk management strategies, supplemental non-oral nutrition is indicated.  Swallow prognosis is good, given patient motivation and family support. Patient appears to be a good candidate for behavioral and exercise based swallow rehabilitation.       PLAN / RECOMMENDATIONS  Diet recommendation: Puree Solids/ Thin Liquids  Risk management: aggressive oral care 3x/day with toothbrush and toothpaste; chin tuck, mendelsohn maneuver, or supraglottic swallow with each bite/sip; double swallow with each bite/sip; HOB to 90 degrees for all PO intake and 30 minutes following PO intake; physical mobility as medically feasible   Therapy: intensive behavioral and exercise based dysphagia therapy 5x/week   Follow-up exam: f/u MBSS after intensive therapy course     Marine Jurado CCC-SLP  3/29/2022